# Patient Record
Sex: MALE | Race: WHITE | NOT HISPANIC OR LATINO | Employment: FULL TIME | URBAN - METROPOLITAN AREA
[De-identification: names, ages, dates, MRNs, and addresses within clinical notes are randomized per-mention and may not be internally consistent; named-entity substitution may affect disease eponyms.]

---

## 2019-06-27 ENCOUNTER — OFFICE VISIT (OUTPATIENT)
Dept: UROLOGY | Facility: MEDICAL CENTER | Age: 44
End: 2019-06-27
Payer: COMMERCIAL

## 2019-06-27 VITALS
WEIGHT: 250 LBS | HEART RATE: 81 BPM | HEIGHT: 72 IN | BODY MASS INDEX: 33.86 KG/M2 | DIASTOLIC BLOOD PRESSURE: 86 MMHG | SYSTOLIC BLOOD PRESSURE: 142 MMHG

## 2019-06-27 DIAGNOSIS — Z85.47 HISTORY OF TESTICULAR CANCER: ICD-10-CM

## 2019-06-27 DIAGNOSIS — N21.1 URETHRAL STONE: ICD-10-CM

## 2019-06-27 DIAGNOSIS — N45.1 EPIDIDYMITIS: Primary | ICD-10-CM

## 2019-06-27 PROBLEM — N50.819 TESTICULAR DISCOMFORT: Status: ACTIVE | Noted: 2019-06-27

## 2019-06-27 LAB
SL AMB  POCT GLUCOSE, UA: NEGATIVE
SL AMB LEUKOCYTE ESTERASE,UA: NEGATIVE
SL AMB POCT BILIRUBIN,UA: NEGATIVE
SL AMB POCT BLOOD,UA: NEGATIVE
SL AMB POCT CLARITY,UA: CLEAR
SL AMB POCT COLOR,UA: YELLOW
SL AMB POCT KETONES,UA: NEGATIVE
SL AMB POCT NITRITE,UA: NEGATIVE
SL AMB POCT PH,UA: 6.5
SL AMB POCT SPECIFIC GRAVITY,UA: 1.02
SL AMB POCT URINE PROTEIN: NEGATIVE
SL AMB POCT UROBILINOGEN: 0.2

## 2019-06-27 PROCEDURE — 99204 OFFICE O/P NEW MOD 45 MIN: CPT | Performed by: UROLOGY

## 2019-06-27 PROCEDURE — 81003 URINALYSIS AUTO W/O SCOPE: CPT | Performed by: UROLOGY

## 2019-06-27 PROCEDURE — 82360 CALCULUS ASSAY QUANT: CPT | Performed by: UROLOGY

## 2019-06-27 RX ORDER — DOXYCYCLINE HYCLATE 100 MG/1
100 CAPSULE ORAL EVERY 12 HOURS SCHEDULED
Qty: 20 CAPSULE | Refills: 0 | Status: SHIPPED | OUTPATIENT
Start: 2019-06-27 | End: 2019-07-07

## 2019-07-08 ENCOUNTER — TELEPHONE (OUTPATIENT)
Dept: UROLOGY | Facility: AMBULATORY SURGERY CENTER | Age: 44
End: 2019-07-08

## 2019-07-08 LAB
COLOR STONE: NORMAL
COMMENT-STONE3: NORMAL
COMPOSITION: NORMAL
LABORATORY COMMENT REPORT: NORMAL
PHOTO: NORMAL
SIZE STONE: NORMAL MM
STONE ANALYSIS-IMP: NORMAL
STONE ANALYSIS-IMP: NORMAL
WT STONE: 3 MG

## 2019-07-09 ENCOUNTER — TELEPHONE (OUTPATIENT)
Dept: UROLOGY | Facility: CLINIC | Age: 44
End: 2019-07-09

## 2019-07-11 ENCOUNTER — OFFICE VISIT (OUTPATIENT)
Dept: UROLOGY | Facility: MEDICAL CENTER | Age: 44
End: 2019-07-11
Payer: COMMERCIAL

## 2019-07-11 VITALS
WEIGHT: 257 LBS | DIASTOLIC BLOOD PRESSURE: 70 MMHG | SYSTOLIC BLOOD PRESSURE: 104 MMHG | HEIGHT: 72 IN | BODY MASS INDEX: 34.81 KG/M2 | HEART RATE: 78 BPM

## 2019-07-11 DIAGNOSIS — Z85.47 HISTORY OF TESTICULAR CANCER: ICD-10-CM

## 2019-07-11 DIAGNOSIS — N21.1 URETHRAL STONE: ICD-10-CM

## 2019-07-11 DIAGNOSIS — N45.1 EPIDIDYMITIS: Primary | ICD-10-CM

## 2019-07-11 PROCEDURE — 99214 OFFICE O/P EST MOD 30 MIN: CPT | Performed by: UROLOGY

## 2019-07-11 RX ORDER — CIPROFLOXACIN 500 MG/1
500 TABLET, FILM COATED ORAL EVERY 12 HOURS SCHEDULED
Qty: 20 TABLET | Refills: 0 | Status: SHIPPED | OUTPATIENT
Start: 2019-07-11 | End: 2019-07-21

## 2019-07-11 NOTE — PROGRESS NOTES
Assessment/Plan:  Persistent epididymitis  Ultrasound at [facility] was normal on June 21st  Treat Cipro 500 b i d  Times 10 days, Motrin 600 b i d  Times 10 days  I have reassured him I do not think he will have a problem with persistent passage of particulate matter in the semen, he is quite worried about the pain that would cause  Follow-up 3-4 weeks  No problem-specific Assessment & Plan notes found for this encounter  Diagnoses and all orders for this visit:    Epididymitis  -     ciprofloxacin (CIPRO) 500 mg tablet; Take 1 tablet (500 mg total) by mouth every 12 (twelve) hours for 10 days    Urethral stone    History of testicular cancer          Subjective:      Patient ID: Ramesh Gann is a 40 y o  male  1  Follow-up for epididymitis which is really not resolved at all since our last visit  Ten days doxycycline did not help at all  Lots of pain upper pole right testicle, penile pain groin pain  2  Small solid material passed in semen was just labeled as protein on analysis  Patient is quite worried about that happening again because he says was very painful coming out  The following portions of the patient's history were reviewed and updated as appropriate: allergies, current medications, past family history, past medical history, past social history, past surgical history and problem list     Review of Systems   All other systems reviewed and are negative  Objective:      /70   Pulse 78   Ht 6' (1 829 m)   Wt 117 kg (257 lb)   BMI 34 86 kg/m²          Physical Exam   Constitutional: He is oriented to person, place, and time  He appears well-developed and well-nourished  No distress  HENT:   Head: Normocephalic and atraumatic  Eyes: Conjunctivae are normal    Cardiovascular: Normal rate and regular rhythm  Pulmonary/Chest: Effort normal and breath sounds normal  No respiratory distress  He has no wheezes  Abdominal: Soft   Bowel sounds are normal  He exhibits no distension and no mass  There is no tenderness  Genitourinary:   Genitourinary Comments: Solitary right testicle is mildly tender, still has inflammatory mass 1 5 cm upper pole epididymis very tender   Neurological: He is alert and oriented to person, place, and time  Skin: Skin is warm and dry  He is not diaphoretic

## 2019-07-15 ENCOUNTER — TELEPHONE (OUTPATIENT)
Dept: UROLOGY | Facility: MEDICAL CENTER | Age: 44
End: 2019-07-15

## 2019-07-15 NOTE — TELEPHONE ENCOUNTER
Patient of Dr Maricruz Bernal last seen 07/11/19  Patient started taking ciprofloxacin (CIPRO) 500 mg tablet  He took one dose one day, and the next dose 12 hours later  He experienced an intense headache, was very wired, and was awake for over 42 hours with his mind racing  Patient has not taken any more of this medication since  Stated he just wanted Dr Maricruz Bernal to know he is no longer taking this because of the reactions  Patient can be reached at [phone]

## 2019-07-18 NOTE — TELEPHONE ENCOUNTER
I left message on his cell phone  We can start another antibiotic  He has been on doxycycline, could not tolerate the Cipro  I recommend Bactrim double-strength twice per day for seven days  I did not send it to the pharmacy at    If he wants to sent to the pharmacy, please go ahead and do that thank you

## 2021-03-31 DIAGNOSIS — Z23 ENCOUNTER FOR IMMUNIZATION: ICD-10-CM

## 2023-11-07 DIAGNOSIS — Z15.09 GENETIC SUSCEPTIBILITY TO CANCER: Primary | ICD-10-CM

## 2023-11-09 ENCOUNTER — TELEPHONE (OUTPATIENT)
Dept: HEMATOLOGY ONCOLOGY | Facility: CLINIC | Age: 48
End: 2023-11-09

## 2023-11-09 NOTE — TELEPHONE ENCOUNTER
I spoke with Paddy Quiñones to schedule their consultation with [facility]. Scheduling Outcome: Patient is scheduled for an appointment on 5/7/24 at 9:00am with Anai Greene     Personal/Family History Related to Appointment:     Personal History of Cancer: Patient reports personal history of Testicular Cancer 2015    Family History of Cancer: Patient reports family history of M. Grandmother - Breast, Mother- Breast    Is patient of Ashkenazi [employer] Heritage?: No    History of Genetic Testing:  Personal History of Genetic Testing: Patient report no personal history of Genetic Testing. Family History of Genetic Testing: Patient reports that no family members have had Genetic Testing. Progeny:  Is patient able to complete our family history questionnaire on a computer?:  Yes    Patient's preferred e-mail address: [email or web address]

## 2024-05-06 ENCOUNTER — TELEPHONE (OUTPATIENT)
Dept: HEMATOLOGY ONCOLOGY | Facility: CLINIC | Age: 49
End: 2024-05-06

## 2024-05-06 ENCOUNTER — TELEPHONE (OUTPATIENT)
Dept: GENETICS | Facility: CLINIC | Age: 49
End: 2024-05-06

## 2024-05-06 NOTE — TELEPHONE ENCOUNTER
I called and left a message for the patient to confirm an upcoming in-office appointment on 5/7/2024 9am with Lauren.

## 2024-05-06 NOTE — TELEPHONE ENCOUNTER
I spoke with Maikel to schedule their consultation with Cancer Risk and Genetics.     Scheduling Outcome: Patient is scheduled for an appointment on 1/6/25 at 9:30 with Rehana Celestin    Personal/Family History Related to Appointment:     Personal History of Cancer:   Personal history of Testicular Cancer 2015    Family History of Cancer:  Family history of MGM Breast Cancer and Mother Breast Cancer.    Is patient of Ashkenazi Taoist Heritage?: No    History of Genetic Testing:  Personal History of Genetic Testing: Patient report no personal history of Genetic Testing.    Family History of Genetic Testing: Patient reports that no family members have had Genetic Testing.     Patient's preferred e-mail address:

## 2024-08-22 ENCOUNTER — OFFICE VISIT (OUTPATIENT)
Dept: PHYSICAL THERAPY | Facility: CLINIC | Age: 49
End: 2024-08-22
Payer: COMMERCIAL

## 2024-08-22 DIAGNOSIS — M54.12 LEFT CERVICAL RADICULOPATHY: Primary | ICD-10-CM

## 2024-08-22 PROCEDURE — 97110 THERAPEUTIC EXERCISES: CPT | Performed by: PHYSICAL THERAPIST

## 2024-08-22 PROCEDURE — 97140 MANUAL THERAPY 1/> REGIONS: CPT | Performed by: PHYSICAL THERAPIST

## 2024-08-22 PROCEDURE — 97161 PT EVAL LOW COMPLEX 20 MIN: CPT | Performed by: PHYSICAL THERAPIST

## 2024-08-22 NOTE — PROGRESS NOTES
PT Evaluation     Today's date: 2024  Patient name: Maikel Isabel  : 1975  MRN: [id number]  Referring provider: Fabio Sterling PT  Dx:   Encounter Diagnosis     ICD-10-CM    1. Left cervical radiculopathy  M54.12                      Assessment  Impairments: abnormal muscle firing, abnormal muscle tone, abnormal or restricted ROM, impaired physical strength, lacks appropriate home exercise program and pain with function    Assessment details: Maikel Iasbel is a pleasant 49 y.o. male presents with L cervical radiculopathy. Significant cervical musculature restriction throughout left cervical region. Educated on HEP to address, along with neutral sleeping positions.     No further referral appears necessary at this time.     Skilled PT services appropriate to facilitate return to prior level of function with transition to home exercise program for independent management when appropriate.    Understanding of Dx/Px/POC: good     Prognosis: good    Goals  Patient will be able to manage symptoms independently  LT weeks  1. pt will reach foto predicted  2. pt will decrease worst pain 75%   ST weeks  1. Pt will decrease worst pain 50%  2. Patient will successfully manage HEP        Plan  Patient would benefit from: skilled PT  Referral necessary: No  Planned modality interventions: thermotherapy: hydrocollator packs    Planned therapy interventions: home exercise program, manual therapy, neuromuscular re-education, patient education, functional ROM exercises, strengthening, stretching, joint mobilization, graded activity, graded exercise, therapeutic exercise, body mechanics training, motor coordination training and activity modification    Frequency: 2x week  Duration in weeks: 12  Treatment plan discussed with: patient        Subjective Evaluation    History of Present Illness  Mechanism of injury: Pt notes last Tuesday he was getting pressing dull pain in the L axilla. He was prescribed muscle  relaxers and ibprofin regularly with relief. He now has some neck pain that goes into the tricep and 2nd/3rd digit. He feels improved at rest but does get some significant pain at night. Pain has been constant. Numbness in L index finger only. He note some weakness in that arm as well. No known NUPUR. He denies any prior surgeries or injury. 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   Pain  Current pain ratin        Objective     Passive Range of Motion   Cervical/Thoracic Spine   Cervical     Left lateral flexion (degrees):  Restriction level: moderate  Right lateral flexion (degrees):  Restriction level: minimal  Left rotation (degrees):  Restriction level: moderate  Right rotation (degrees):  Restriction level: minimal    Strength/Myotome Testing   Cervical Spine     Left   Interossei strength (t1): 5    Right   Normal strength    Left Shoulder     Planes of Motion   External rotation at 0°: 5     Left Elbow   Flexion: 5  Extension: 4+    Left Wrist/Hand   Wrist extension: 5  Wrist flexion: 5  Thumb extension: 5    General Comments:      Cervical/Thoracic Comments  L SCM, UT, scalenes with significant restriction  L C5-6 AP mobility restricted-> improved chin tuck post mobilization    Minimal irritation with axilllary musculature palpation  L scap in siting elevated relative to R  Worsening arm pain with cervical distraction  +ULTT median L             Precautions: none      Manuals             Scalene, SCM STM CB            C5 AP mobs left CB                                      Neuro Re-Ed                                                                                                        Ther Ex             Scap depression 10x10:            Scalene towel stretch 10x10:             Sleeping posture ed reviewed                                                                             Ther Activity                                       Gait Training                                       Modalities

## 2024-08-29 ENCOUNTER — OFFICE VISIT (OUTPATIENT)
Dept: PHYSICAL THERAPY | Facility: CLINIC | Age: 49
End: 2024-08-29
Payer: COMMERCIAL

## 2024-08-29 DIAGNOSIS — M54.12 LEFT CERVICAL RADICULOPATHY: Primary | ICD-10-CM

## 2024-08-29 PROCEDURE — 97140 MANUAL THERAPY 1/> REGIONS: CPT | Performed by: PHYSICAL THERAPIST

## 2024-08-29 PROCEDURE — 97110 THERAPEUTIC EXERCISES: CPT | Performed by: PHYSICAL THERAPIST

## 2024-08-29 NOTE — PROGRESS NOTES
Daily Note     Today's date: 2024  Patient name: Maikel Isabel  : 1975  MRN: [id number]  Referring provider: Fabio Sterling, PT  Dx:   Encounter Diagnosis     ICD-10-CM    1. Left cervical radiculopathy  M54.12                      Subjective: Pt notes no changes in neck pain recently. He continues with hand numbness and pain in the posterior arm. He moved his kids into college as well and feels that didn't help his sxs at all.       Objective: See treatment diary below      Assessment: Tolerated treatment well. Patient would benefit from continued PT. Pt with 80% cervical left rotation post visit. Educated on nerve glides for home. ULTT median improved from IE.       Plan: Continue per plan of care.      Precautions: none      Manuals            Scalene, SCM STM CB CB           C5 AP mobs left CB CB                                     Neuro Re-Ed                                                                                                        Ther Ex             Scap depression 10x10: CB           Scalene towel stretch 10x10:  NV           Sleeping posture ed reviewed            Supine median nerve glides  x15                                                               Ther Activity                                       Gait Training                                       Modalities

## 2024-09-03 ENCOUNTER — OFFICE VISIT (OUTPATIENT)
Dept: PHYSICAL THERAPY | Facility: CLINIC | Age: 49
End: 2024-09-03
Payer: COMMERCIAL

## 2024-09-03 DIAGNOSIS — M54.12 LEFT CERVICAL RADICULOPATHY: Primary | ICD-10-CM

## 2024-09-03 PROCEDURE — 97140 MANUAL THERAPY 1/> REGIONS: CPT | Performed by: PHYSICAL THERAPIST

## 2024-09-03 PROCEDURE — 97110 THERAPEUTIC EXERCISES: CPT | Performed by: PHYSICAL THERAPIST

## 2024-09-03 NOTE — PROGRESS NOTES
Daily Note     Today's date: 9/3/2024  Patient name: Maikel Isabel  : 1975  MRN: [id number]  Referring provider: Fabio Sterling, PT  Dx:   Encounter Diagnosis     ICD-10-CM    1. Left cervical radiculopathy  M54.12                      Subjective: Pt reports decreased pain for a few days after lv in the shoulder and arm. Pain has returned and neck mobility worsened slightly again.       Objective: See treatment diary below      Assessment: Tolerated treatment well. Patient would benefit from continued PT. Continue to mobilize L cervical spine as tolerated. Improved forearm pain with radial nerve tracing.       Plan: Continue per plan of care.      Precautions: none      Manuals 8/22 8/30 9/3          Scalene, SCM STM CB CB CB          C5 AP mobs left CB CB CB          L forearm mobilization   CB                       Neuro Re-Ed                                                                                                        Ther Ex             Scap depression 10x10: CB CB          Scalene towel stretch 10x10:  NV           Sleeping posture ed reviewed            Supine median nerve glides  x15 CB          Radial nerve glides   x15                                                 Ther Activity                                       Gait Training                                       Modalities             [facility]   5'

## 2024-09-05 ENCOUNTER — OFFICE VISIT (OUTPATIENT)
Dept: PHYSICAL THERAPY | Facility: CLINIC | Age: 49
End: 2024-09-05
Payer: COMMERCIAL

## 2024-09-05 DIAGNOSIS — M54.12 LEFT CERVICAL RADICULOPATHY: Primary | ICD-10-CM

## 2024-09-05 PROCEDURE — 97140 MANUAL THERAPY 1/> REGIONS: CPT | Performed by: PHYSICAL THERAPIST

## 2024-09-05 PROCEDURE — 97010 HOT OR COLD PACKS THERAPY: CPT | Performed by: PHYSICAL THERAPIST

## 2024-09-05 PROCEDURE — 97110 THERAPEUTIC EXERCISES: CPT | Performed by: PHYSICAL THERAPIST

## 2024-09-05 NOTE — PROGRESS NOTES
Daily Note     Today's date: 2024  Patient name: Maikel Isabel  : 1975  MRN: [id number]  Referring provider: Fabio Sterling, PT  Dx:   Encounter Diagnosis     ICD-10-CM    1. Left cervical radiculopathy  M54.12                      Subjective: Pt reports improvement in sxs for 1.5 days after lv but pain returned again today, He was gently throwing frisbee with his gym class and feels this irritated the lateral arm and forearm more.       Objective: See treatment diary below      Assessment: Tolerated treatment well. Patient would benefit from continued PT. Continued mild forearm paresthesia post visit. Cervical rotation ROM improved post session.       Plan: Continue per plan of care.      Precautions: none      Manuals 8/22 8/30 9/3 9/5         Scalene, SCM STM CB CB CB CB         C5 AP mobs left CB CB CB CB         L forearm mobilization   CB CB                      Neuro Re-Ed             Scap retract w/tband    10x10: RTB                                                                                       Ther Ex             Scap depression 10x10: CB CB CB         Scalene towel stretch 10x10:  NV  10x10:         Sleeping posture ed reviewed            Supine median nerve glides  x15 CB CB         Radial nerve glides   x15 nv                                                Ther Activity                                       Gait Training                                       Modalities             [facility]   5' 5'

## 2024-09-10 ENCOUNTER — OFFICE VISIT (OUTPATIENT)
Dept: PHYSICAL THERAPY | Facility: CLINIC | Age: 49
End: 2024-09-10
Payer: COMMERCIAL

## 2024-09-10 DIAGNOSIS — M54.12 LEFT CERVICAL RADICULOPATHY: Primary | ICD-10-CM

## 2024-09-10 PROCEDURE — 97110 THERAPEUTIC EXERCISES: CPT | Performed by: PHYSICAL THERAPIST

## 2024-09-10 PROCEDURE — 97140 MANUAL THERAPY 1/> REGIONS: CPT | Performed by: PHYSICAL THERAPIST

## 2024-09-10 NOTE — PROGRESS NOTES
Daily Note     Today's date: 9/10/2024  Patient name: Maikel Isabel  : 1975  MRN: [id number]  Referring provider: Fabio Sterling, PT  Dx:   Encounter Diagnosis     ICD-10-CM    1. Left cervical radiculopathy  M54.12                      Subjective: Pt reports decreased pain in the arm and neck recently.       Objective: See treatment diary below      Assessment: Tolerated treatment well. Patient would benefit from continued PT. Continued limitation in L scalene mobility and cervical retraction ROM.       Plan: Continue per plan of care.      Precautions: none      Manuals 8/22 8/30 9/3 9/5 9/10        Scalene, SCM STM CB CB CB CB CB        C5 AP mobs left CB CB CB CB CB        L forearm mobilization   CB CB                      Neuro Re-Ed             Scap retract w/tband    10x10: RTB CB        Thoracic ext o pillow     10x10: chin tucks, 10x10: LTR                                                                         Ther Ex             Scap depression 10x10: CB CB CB CB        Scalene towel stretch 10x10:  NV  10x10: CB        Sleeping posture ed reviewed            Supine median nerve glides  x15 CB CB CB        Radial nerve glides   x15 nv                                                Ther Activity                                       Gait Training                                       Modalities             [facility]   5' 5' 5'

## 2024-09-12 ENCOUNTER — OFFICE VISIT (OUTPATIENT)
Dept: PHYSICAL THERAPY | Facility: CLINIC | Age: 49
End: 2024-09-12
Payer: COMMERCIAL

## 2024-09-12 DIAGNOSIS — M54.12 LEFT CERVICAL RADICULOPATHY: Primary | ICD-10-CM

## 2024-09-12 PROCEDURE — 97110 THERAPEUTIC EXERCISES: CPT | Performed by: PHYSICAL THERAPIST

## 2024-09-12 PROCEDURE — 97140 MANUAL THERAPY 1/> REGIONS: CPT | Performed by: PHYSICAL THERAPIST

## 2024-09-12 PROCEDURE — 97010 HOT OR COLD PACKS THERAPY: CPT | Performed by: PHYSICAL THERAPIST

## 2024-09-12 NOTE — PROGRESS NOTES
Daily Note     Today's date: 2024  Patient name: Maikel Isabel  : 1975  MRN: [id number]  Referring provider: Fabio Sterling, PT  Dx:   Encounter Diagnosis     ICD-10-CM    1. Left cervical radiculopathy  M54.12                      Subjective: Pt notes continued improvement in arm pain since lv. He feels 80% improved in sxs at this point.       Objective: See treatment diary below      Assessment: Tolerated treatment well. Patient would benefit from continued PT. Improvement in neural mobility noted. Continued 1st rib mobility deficits.       Plan: Continue per plan of care.      Precautions: none      Manuals 8/22 8/30 9/3 9/5 9/10 9/12       Scalene, SCM STM CB CB CB CB CB CB       C5 AP mobs left CB CB CB CB CB CB       L forearm mobilization   CB CB                      Neuro Re-Ed             Scap retract w/tband    10x10: RTB CB CB       Thoracic ext o pillow     10x10: chin tucks, 10x10: LTR CB                                                                        Ther Ex             Scap depression 10x10: CB CB CB CB        Scalene towel stretch 10x10:  NV  10x10: CB        Sleeping posture ed reviewed            Supine median nerve glides  x15 CB CB CB CB       Radial nerve glides   x15 nv                                                Ther Activity                                       Gait Training                                       Modalities             [facility]   5' 5' 5' 5'

## 2024-09-17 ENCOUNTER — OFFICE VISIT (OUTPATIENT)
Dept: PHYSICAL THERAPY | Facility: CLINIC | Age: 49
End: 2024-09-17
Payer: COMMERCIAL

## 2024-09-17 DIAGNOSIS — M54.12 LEFT CERVICAL RADICULOPATHY: Primary | ICD-10-CM

## 2024-09-17 PROCEDURE — 97140 MANUAL THERAPY 1/> REGIONS: CPT | Performed by: PHYSICAL THERAPIST

## 2024-09-17 PROCEDURE — 97110 THERAPEUTIC EXERCISES: CPT | Performed by: PHYSICAL THERAPIST

## 2024-09-17 NOTE — PROGRESS NOTES
Daily Note     Today's date: 2024  Patient name: Maikel Isabel  : 1975  MRN: [id number]  Referring provider: Fabio Sterling, PT  Dx:   Encounter Diagnosis     ICD-10-CM    1. Left cervical radiculopathy  M54.12                      Subjective: Pt notes continued improvement in neck pain and function. He still has some index finger numbness and it feels more full.       Objective: See treatment diary below      Assessment: Tolerated treatment well. Patient would benefit from continued PT. Pt with improvement in pain post nerve tensioners and postural restrengthening.       Plan: Continue per plan of care.      Precautions: none      Manuals 8/22 8/30 9/3 9/5 9/10 9/12 9/17      Scalene, SCM STM CB CB CB CB CB CB CB      C5 AP mobs left CB CB CB CB CB CB CB      L forearm mobilization   CB CB                      Neuro Re-Ed             Scap retract w/tband    10x10: RTB CB CB CB      Thoracic ext o pillow     10x10: chin tucks, 10x10: LTR CB CB                                                                       Ther Ex             Scap depression 10x10: CB CB CB CB        Scalene towel stretch 10x10:  NV  10x10: CB        Sleeping posture ed reviewed            Supine median nerve glides  x15 CB CB CB CB Wall scap set 10x10:      Radial nerve glides   x15 nv                                                Ther Activity                                       Gait Training                                       Modalities             [facility]   5' 5' 5' 5'

## 2024-09-19 ENCOUNTER — OFFICE VISIT (OUTPATIENT)
Dept: PHYSICAL THERAPY | Facility: CLINIC | Age: 49
End: 2024-09-19
Payer: COMMERCIAL

## 2024-09-19 DIAGNOSIS — M54.12 LEFT CERVICAL RADICULOPATHY: Primary | ICD-10-CM

## 2024-09-19 PROCEDURE — 97140 MANUAL THERAPY 1/> REGIONS: CPT | Performed by: PHYSICAL THERAPIST

## 2024-09-19 PROCEDURE — 97110 THERAPEUTIC EXERCISES: CPT | Performed by: PHYSICAL THERAPIST

## 2024-09-19 NOTE — PROGRESS NOTES
Daily Note     Today's date: 2024  Patient name: Maikel Isabel  : 1975  MRN: [id number]  Referring provider: Fabio Sterling, PT  Dx:   Encounter Diagnosis     ICD-10-CM    1. Left cervical radiculopathy  M54.12                      Subjective: Pt notes some continued numbness in the pointer finger but other pains have all been improving well.       Objective: See treatment diary below      Assessment: Tolerated treatment well. Patient would benefit from continued PT. Pt with continued 1st digit numbness throughout session. Educated on further forearm stretching for home.       Plan: Continue per plan of care.      Precautions: none      Manuals 8/22 8/30 9/3 9/5 9/10 9/12 9/17 9/19     Scalene, SCM STM CB CB CB CB CB CB CB CB     C5 AP mobs left CB CB CB CB CB CB CB      L forearm mobilization   CB CB    CB                  Neuro Re-Ed             Scap retract w/tband    10x10: RTB CB CB CB GTB 10x10:     Thoracic ext o pillow     10x10: chin tucks, 10x10: LTR CB CB CB     Shoulder ext         10x10: GTB supinated                                                         Ther Ex             Scap depression 10x10: CB CB CB CB        Scalene towel stretch 10x10:  NV  10x10: CB        Sleeping posture ed reviewed            Supine median nerve glides  x15 CB CB CB CB Wall scap set 10x10: 10x10:     Radial nerve glides   x15 nv                                                Ther Activity                                       Gait Training                                       Modalities             [facility]   5' 5' 5' 5'

## 2024-09-24 ENCOUNTER — OFFICE VISIT (OUTPATIENT)
Dept: PHYSICAL THERAPY | Facility: CLINIC | Age: 49
End: 2024-09-24
Payer: COMMERCIAL

## 2024-09-24 DIAGNOSIS — M54.12 LEFT CERVICAL RADICULOPATHY: Primary | ICD-10-CM

## 2024-09-24 PROCEDURE — 97140 MANUAL THERAPY 1/> REGIONS: CPT | Performed by: PHYSICAL THERAPIST

## 2024-09-24 PROCEDURE — 97110 THERAPEUTIC EXERCISES: CPT | Performed by: PHYSICAL THERAPIST

## 2024-09-24 NOTE — PROGRESS NOTES
Daily Note     Today's date: 2024  Patient name: Maikel Isabel  : 1975  MRN: [id number]  Referring provider: Fabio Sterling PT  Dx:   Encounter Diagnosis     ICD-10-CM    1. Left cervical radiculopathy  M54.12                      Subjective: Pt reports minimal pain today with mild paresthesia in pointer finger. He is feeling good with making today the final visit.       Objective: See treatment diary below      Assessment: Tolerated treatment well. Patient exhibited good technique with therapeutic exercises      Plan:  DC     Precautions: none      Manuals 8/22 8/30 9/3 9/5 9/10 9/12 9/17 9/19 9/24    Scalene, SCM STM CB CB CB CB CB CB CB CB CB    C5 AP mobs left CB CB CB CB CB CB CB      L forearm mobilization   CB CB    CB CB                 Neuro Re-Ed             Scap retract w/tband    10x10: RTB CB CB CB GTB 10x10: BTB 10x10:    Thoracic ext o pillow     10x10: chin tucks, 10x10: LTR CB CB CB np    Shoulder ext         10x10: GTB supinated                                                         Ther Ex             Scap depression 10x10: CB CB CB CB        Scalene towel stretch 10x10:  NV  10x10: CB        Sleeping posture ed reviewed            Supine median nerve glides  x15 CB CB CB CB Wall scap set 10x10: 10x10: CB    Radial nerve glides   x15 nv         rows         X20 15#    scaption         X15 4#                 Ther Activity                                       Gait Training                                       Modalities             [facility]   5' 5' 5' 5'

## 2024-09-26 ENCOUNTER — APPOINTMENT (OUTPATIENT)
Dept: PHYSICAL THERAPY | Facility: CLINIC | Age: 49
End: 2024-09-26
Payer: COMMERCIAL

## 2025-01-03 ENCOUNTER — TELEPHONE (OUTPATIENT)
Dept: GENETICS | Facility: CLINIC | Age: 50
End: 2025-01-03

## 2025-01-06 ENCOUNTER — OFFICE VISIT (OUTPATIENT)
Dept: GENETICS | Facility: CLINIC | Age: 50
End: 2025-01-06
Payer: COMMERCIAL

## 2025-01-06 DIAGNOSIS — Z80.3 FHX: BREAST CANCER: Primary | ICD-10-CM

## 2025-01-06 PROCEDURE — 96041 GENETIC COUNSELING SVC EA 30: CPT

## 2025-01-06 NOTE — PROGRESS NOTES
Pre-Test Genetic Counseling Consult Note    Patient Name: Maikel Isabel   /Age: 1975/49 y.o.  Referring Provider: Zachariah Kilgore MD    Date of Service: 2025  Genetic Counselor: Rehana Celestin MS, [facility] [place]  Interpretation Services: None  Location: Telephone consult   Length of Visit: 30 Minutes    Maikel was referred to the [facility] Cancer Risk and Genetic Assessment Program due to his personal history of testicular cancer and family history of breast cancer . he presents today to discuss the possibility of a hereditary cancer syndrome, options for genetic testing, and implications for him and his family. His wife, Kristal, was present during the appointment.     Cancer History and Treatment:   Personal History:   Left testicular cancer (stage IB seminoma) diagnosed at 39 y/o.     Screening Hx:   Colon:  Colonoscopy (~ at [facility]): 0 polyps per patient     Skin:  Sees derm annually    Prostate:  Prostate screening (): PSA= 0.40 ng/mL     Maikel denies a personal history of developmental delay, learning disabilities, gynecomastia, among other features indicative of Klinefelter syndrome. He reports the men in his family are all over 6 ft tall.     Medical and Surgical History  Pertinent surgical history:   Past Surgical History:   Procedure Laterality Date    HYDROCELE EXCISION / REPAIR  2002    TESTICLE SURGERY Left 2015    Removal - testicular cancer    VASECTOMY Bilateral       Pertinent medical history:  Past Medical History:   Diagnosis Date    Left testicular cancer (HCC) 2015    Painful ejaculation          Other History:  Height:   Ht Readings from Last 1 Encounters:   19 6' (1.829 m)     Weight:   Wt Readings from Last 1 Encounters:   19 117 kg (257 lb)       Relevant Family History   Patient reports non-Ashkenazi Anabaptist ancestry.     Maternal Family History:  Mother: breast cancer diagnosed at 67, s/p lumpectomy + RT (currently 73 y/o)   Grandmother: breast  cancer diagnosed at unknown age (d.72)     Paternal Family History:  Aunt: pancreatic cancer diagnosed at 50 ()     Please refer to the scanned pedigree in the Media Tab for a complete family history     *All history is reported as provided by the patient; records are not available for review, except where indicated.     Assessment:  We discussed sporadic, familial and hereditary cancer.  We reviewed that only 5-10% of cancers are considered hereditary. Cancers such as lung cancer, cervical cancer, testicular cancer, and liver cancer very rarely have a hereditary etiology, and we cannot test for a genetic predisposition for these cancers at this time.  We also discussed the many factors that influence our risk for cancer such as age, environmental exposures, lifestyle choices and family history.      We reviewed the indications suggestive of a hereditary predisposition to cancer.    Although the personal and family history of cancer is not consistent with the typical presentation of a hereditary cancer syndrome, genetic testing may be considered to rule out pathogenic variants in moderately penetrant genes which have clear management recommendations.      The risks, benefits, and limitations of genetic testing were reviewed with the patient, as well as genetic discrimination laws, and possible test results (positive, negative, variants of uncertain significance) and their clinical implications. If positive for a mutation, options for managing cancer risk including increased surveillance, chemoprevention, and in some cases prophylactic surgery were discussed.     Maikel was informed that if a hereditary cancer syndrome was identified in him, first degree relatives (parents, siblings, and children) have a chance of also inheriting the condition. Genetic testing would allow for predictive genetic testing in other relatives, who may also be at risk depending on their degree of relation.     Billing:  Most  individuals pay <$100 for hereditary cancer genetic testing. If insurance covers the cost of the testing, individuals may still pay out of pocket secondary to co-pays, co-insurance, or deductibles. If the cost of the testing exceeds $100, the lab will reach out to the patient via phone or e-mail. The patient will then have the option to proceed with the testing, cancel the testing, or elect the self-pay option of $250. Maikel verbalized understanding.     Plan: Patient decided to proceed with testing and provided consent.    Summary:     Sample Collection:  An order was placed and the patient was instructed to go to a [facility] lab for a blood collection    Genetic Testing Preformed: [employer]: Cancer® +RNAinsight® (60 genes): APC, MARJORIE, AXIN2, BAP1, BARD1, BMPR1A, BRCA1, BRCA2, BRIP1, CDH1, CDK4, CDKN1B, CDKN2A, CHEK2, CTNNA1, DICER1, EGLN1, EPCAM, FH, FLCN, GREM1, HOXB13, KIF1B, KIT, MAX, MBD4, MEN1, MET, MITF, MLH1, MSH2, MSH3, MSH6, MUTYH, NF1, NTHL1, PALB2, PDGFRA PMS2, POLD1, POLE, POT1, PTEN, RAD51C, RAD51D, RB1, RET, SDHA, SDHAF2, SDHB, SDHC, SDHD, SMAD4, SMARCA4, STK11, TFRB099, TP53, TSC1, TSC2, VHL      Result Call Information:  In the event that we need to reach Maikel via telephone:  I confirmed the patient's mobile number on file as the best number to call with results  I confirmed with the patient that we can leave a voicemail on his mobile number    Results take approximately 2-3 weeks to complete once test is started.    Maikel will be notified via [employer] once results are available.      Additional recommendations for surveillance/medical management will be made pending genetic test results.